# Patient Record
Sex: MALE | Race: OTHER
[De-identification: names, ages, dates, MRNs, and addresses within clinical notes are randomized per-mention and may not be internally consistent; named-entity substitution may affect disease eponyms.]

---

## 2020-03-08 ENCOUNTER — HOSPITAL ENCOUNTER (EMERGENCY)
Dept: HOSPITAL 74 - JER | Age: 20
Discharge: HOME | End: 2020-03-08
Payer: COMMERCIAL

## 2020-03-08 VITALS — BODY MASS INDEX: 27.8 KG/M2

## 2020-03-08 VITALS — SYSTOLIC BLOOD PRESSURE: 120 MMHG | DIASTOLIC BLOOD PRESSURE: 73 MMHG | TEMPERATURE: 97.4 F | HEART RATE: 65 BPM

## 2020-03-08 DIAGNOSIS — J45.901: Primary | ICD-10-CM

## 2020-03-08 PROCEDURE — 3E0F7GC INTRODUCTION OF OTHER THERAPEUTIC SUBSTANCE INTO RESPIRATORY TRACT, VIA NATURAL OR ARTIFICIAL OPENING: ICD-10-PCS

## 2020-03-08 RX ADMIN — IPRATROPIUM BROMIDE AND ALBUTEROL SULFATE SCH AMP: .5; 3 SOLUTION RESPIRATORY (INHALATION) at 11:30

## 2020-03-08 RX ADMIN — IPRATROPIUM BROMIDE AND ALBUTEROL SULFATE SCH AMP: .5; 3 SOLUTION RESPIRATORY (INHALATION) at 11:20

## 2020-03-08 NOTE — PDOC
Attending Attestation





- Resident


Resident Name: Danny Blankenship





- ED Attending Attestation


I have performed the following: I have examined & evaluated the patient, The 

case was reviewed & discussed with the resident, I agree w/resident's findings &

plan





- HPI


HPI: 





03/08/20 11:05


18y/o M with h/o mild persistent asthma on inhaled steroid daily presents now 

with 2d asthma exacerbation and usual sxs of wheezing, dry cough, chest 

congestion. Sxs prompted by accidentally inhaling his cologne, denies 

smoking/drugs. no f/c. 


has not been admitted for asthma in years, but has about 4 ED visits annually. 








- Physicial Exam


PE: 





03/08/20 11:06


afebrile, 98% on room air


alert seated comfortably in stretcher, nad speaking full sentences


s1s2, rrr


good air entry b/l, no accessory muscle use. scant insp and exp wheeze b/l lower

and mid lung fields, no focally decreased breath sounds. 


no edema/calf ttp








- Medical Decision Making





03/08/20 11:11


19-year-old male with history of mild persistent asthma presents with asthma 

exacerbation over the last 1 to 2 days without evidence of  superimposed 

infectious process, no acute hypoxic respiratory distress, but with some 

increased work of breathing.


Nebulizers


Steroids


No indication for imaging


Reassess and disposition accordingly

## 2020-03-08 NOTE — PDOC
History of Present Illness





- General


Chief Complaint: Asthma


Stated Complaint: SOB


Time Seen by Provider: 03/08/20 10:18


History Source: Patient


Exam Limitations: No Limitations





- History of Present Illness


Initial Comments: 





03/08/20 10:19


Mac Roberts is a 19M with PMH asthma presenting with SOB and wheezing.





Patient has history of severe childhood asthma with many hospitalizations and 

one intubation, as an adult has infrequent hospitalizations, on Symbicort QD and

albuterol inhaler PRN, last asthma exacerbation 3 weeks ago. Allergies to pollen

and cats.





Patient reports he has had SOB and wheezing for the last day, was sitting on a 

bus back to [place] from [place] on a 29 hour trip and was wheezing the entire time. 

Does not have albuterol inhaler, tried taking Symbicort inhaler multiple times 

without significant relief. 





Denies any fever/chills/nausea/vomiting/chest pain/SOB.





Past History





- Past Medical History


Allergies/Adverse Reactions: 


                                    Allergies











Allergy/AdvReac Type Severity Reaction Status Date / Time


 


No Known Allergies Allergy   Verified 03/08/20 10:39











Home Medications: 


Ambulatory Orders





Albuterol 0.083% Nebulizer Sol [Ventolin 0.083% Nebulizer Soln -] 1 neb NEB Q4H 

PRN #30 vial 03/08/20 


Albuterol Sulfate Inhaler - [Ventolin Hfa Inhaler -] 1 - 2 inh PO Q4H #1 inhaler

03/08/20 


Budesonide/Formeterol Fumarate [SYMBICORT 80/4.5mcg -] 1 inh PO DAILY #1 

cannister 03/08/20 


Nebulizer [Lc D Nebulizer Set] 1 each  ONCE #1 each 03/08/20 


predniSONE [Deltasone -] 40 mg PO DAILY 5 Days #10 tablet 03/08/20 











**Review of Systems





- Review of Systems


Able to Perform ROS?: Yes


Constitutional: No: Chills, Fever


HEENTM: No: Symptoms Reported


Respiratory: Yes: Shortness of Breath, SOB at Rest, Wheezing.  No: Cough, 

Productive cough


Cardiac (ROS): No: Chest Pain, Edema, Irregular Heart Rate, Lightheadedness, 

Palpitations, Syncope, Chest Tightness


ABD/GI: No: Blood Streaked Bowels, Constipated, Diarrhea, Nausea, Poor Appetite,

Poor Fluid Intake, Vomiting


: No: Symptoms Reported


Musculoskeletal: No: Symptoms Reported


Integumentary: No: Symptoms Reported


Neurological: No: Headache, Numbness, Paresthesia


Endocrine: No: Symptoms Reported


Hematologic/Lymphatic: No: Symptoms Reported


All Other Systems: Reviewed and Negative





*Physical Exam





- Physical Exam


General Appearance: Yes: Nourished, Appropriately Dressed, Mild Distress


HEENT: positive: EOMI, SONAL, Normal Voice, Symmetrical, Pharynx Normal.  

negative: Scleral Icterus (R), Scleral Icterus (L), Pharyngeal Erythema, 

Tonsillar Exudate, Tonsillar Erythema, Hearing Grossly Normal


Neck: positive: Normal Thyroid, Supple.  negative: Tender, Trachea midline, 

Rigid, Lymphadenopathy (R), Lymphadenopathy (L)


Respiratory/Chest: positive: Labored Respiration, Decreased Breath Sounds, 

Wheezing.  negative: Chest Tender, Lungs Clear, Normal Breath Sounds, 

Respiratory Distress, Accessory Muscle Use, Crackles, Rhonchi, Stridor, 

Hyperresonant


Cardiovascular: positive: Regular Rhythm, Regular Rate.  negative: Murmur


Gastrointestinal/Abdominal: positive: Normal Bowel Sounds, Flat, Soft.  

negative: Tender, Organomegaly, Pulsatile Mass, Guarding, Tenderness, Hernia, 

Mass


Musculoskeletal: positive: Normal Inspection.  negative: CVA Tenderness, 

Decreased Range of Motion, Vertebral Tenderness


Extremity: positive: Normal Capillary Refill, Normal Inspection, Normal Range of

Motion, Pelvis Stable.  negative: Tender, Pedal Edema, Swelling, Calf Tenderness


Integumentary: positive: Normal Color, Dry, Warm


Neurologic: positive: Fully Oriented, Alert, Normal Mood/Affect, Normal Response





Medical Decision Making





- Medical Decision Making





03/08/20 11:22


Presents with SOB and wheezing for the last day in the setting of only having 

Symbicort inhaler and no albuterol, has history of childhood asthma and 

intubation as a child but not severe asthma as an adult, last ER visit for 

asthma 3 weeks ago. VS stable in ED but has inspiratory and expiratory wheezing 

on exam consistent with asthma exacerbation.





Giving Duonebs and 60mg prednisone for asthma relief.





Sending home with 5 days 40mg prednisone dose, refill of Symbicort and 

albuterol, and a nebulizer with some amps for further asthma management.





03/08/20 12:33


Patient re-evalauted and doing much better, stable for discharge home with meds 

as prescribed.











Discharge





- Discharge Information


Problems reviewed: Yes


Clinical Impression/Diagnosis: 


Asthma exacerbation


Qualifiers:


 Asthma severity: moderate Asthma persistence: unspecified Qualified Code(s): 

J45.901 - Unspecified asthma with (acute) exacerbation





Condition: Stable


Disposition: HOME





- Admission


No





- Additional Discharge Information


Prescriptions: 


predniSONE [Deltasone -] 40 mg PO DAILY 5 Days #10 tablet


Nebulizer [Lc D Nebulizer Set] 1 each MC ONCE #1 each


Budesonide/Formeterol Fumarate [SYMBICORT 80/4.5mcg -] 1 inh PO DAILY #1 

cannister


Albuterol 0.083% Nebulizer Sol [Ventolin 0.083% Nebulizer Soln -] 1 neb NEB Q4H 

PRN #30 vial


 PRN Reason: Asthma


Albuterol Sulfate Inhaler - [Ventolin Hfa Inhaler -] 1 - 2 inh PO Q4H #1 inhaler





- Follow up/Referral





- Patient Discharge Instructions


Patient Printed Discharge Instructions:  [facility] for Ast[facility] -- Adult


Additional Instructions: 


Today you were evaluated for shortness of breath. We have given you breathing 

treatments and steroids for your asthma and you felt better. At home, please 

take your Symbicort and albuterol as prescribed. Taking the Symbicort each day 

is important, but if you have wheezing you need to take the albuterol inhaler 

because this will act faster. A prescription for more steroids to take at home 

has been given. Please see your primary doctor in the next 3 days for further 

care. If you experience worsening shortness of breath, chest tightness, become 

unable to speak or breathe well, or have any other new or concerning symptoms, 

please return to the emergency room.





- Post Discharge Activity

## 2021-12-26 ENCOUNTER — HOSPITAL ENCOUNTER (EMERGENCY)
Dept: HOSPITAL 74 - JER | Age: 21
Discharge: HOME | End: 2021-12-26
Payer: COMMERCIAL

## 2021-12-26 VITALS — BODY MASS INDEX: 19.2 KG/M2

## 2021-12-26 VITALS — TEMPERATURE: 97.1 F | HEART RATE: 95 BPM | DIASTOLIC BLOOD PRESSURE: 91 MMHG | SYSTOLIC BLOOD PRESSURE: 141 MMHG

## 2021-12-26 DIAGNOSIS — B34.9: Primary | ICD-10-CM

## 2021-12-26 PROCEDURE — U0005 INFEC AGEN DETEC AMPLI PROBE: HCPCS

## 2021-12-26 PROCEDURE — U0003 INFECTIOUS AGENT DETECTION BY NUCLEIC ACID (DNA OR RNA); SEVERE ACUTE RESPIRATORY SYNDROME CORONAVIRUS 2 (SARS-COV-2) (CORONAVIRUS DISEASE [COVID-19]), AMPLIFIED PROBE TECHNIQUE, MAKING USE OF HIGH THROUGHPUT TECHNOLOGIES AS DESCRIBED BY CMS-2020-01-R: HCPCS

## 2021-12-26 PROCEDURE — C9803 HOPD COVID-19 SPEC COLLECT: HCPCS
